# Patient Record
Sex: MALE | Race: WHITE
[De-identification: names, ages, dates, MRNs, and addresses within clinical notes are randomized per-mention and may not be internally consistent; named-entity substitution may affect disease eponyms.]

---

## 2007-09-25 VITALS — SYSTOLIC BLOOD PRESSURE: 172.7 MMHG

## 2020-01-15 NOTE — NUR
Patient is resting quietly in bed at this time. Denies presence of pain or
discomfort. Vitals within normal limits. A second peripheral IV is initiated
in the right forearm due to multiple scheduled IV medications. Will continue
to monitor.

## 2020-01-16 NOTE — NUR
Received report from PARRISH Agustin. Assessment complete. Alert and oriented.
Denies any pain or discomfort. Denies SOB. Meds adminsitered. RFA IV intact
with antibiotics infusing. On 2LO2 via oximask. tele moniotor in place, leads
checked. Needs attended too. Call light within reach.

## 2020-01-16 NOTE — NUR
Patient's BP was fluctuating with MAPs < 65. Patient asymptomatic. Dr. Howard
notified. Received orders to bolus 250mL NS IV one time.

## 2020-01-16 NOTE — NUR
MSW student met with patient to complete initial assessment. The patient lives
in [place] with his wife, Anita. The patient has a walker and is on 3L of
oxygen at home. The patient receives assistance with showers (2x weekly on
Tuesdays and Fridays) [facility]. The patient's RN
 is Sara [phone]. Any discharge instructions or updates
should be faxed to Trish at [phone]. MSW student faxed HNP. The
patient's PCP is Dr. Cotton and patient receives medications from [facility].
The patient has advanced directives in the EMR and designate his wife,
Anita.  will continue to follow.

## 2020-01-17 NOTE — NUR
Initial shift assessment done- denies pain- states didnt eat well tonight-did
not like the supper, denies pain, denies SOB, o2 at 2L/nc, Tele on, no
requests at this time- using call light appropriately

## 2020-01-17 NOTE — NUR
Patient resting in bed at this time.  He is on 02 at 2L/Oxy mask.  Changed to
02 at 2L/NC for breakfast.  Patient is alert and oriented x 3.  Denies any
pain/discomfort.  Skin w/d.  Color pale pink.  Lungs essentially clear with
resp even/unlabored.  Patient denies any production with cough.  HR
strong/regular.  Abd soft with bowel sounds x 4 quads.  PPP.  No edema.
Denies any needs.  Call light in place

## 2020-01-18 NOTE — NUR
Patient in bed, awake. Denies pain. IV in left FA flushed with NS, patent.
Denies further needs at this time. Will continue to monitor.

## 2020-01-18 NOTE — NUR
Assessment complete. Patient lying in bed resting. IV site CD&I. Patient is
aware of POC. Denies pain and shortness of breath at this time. Assisted
patient to the bathroom to have a loose BM. No further needs were expressed at
this time. Call light in reach.

## 2020-01-18 NOTE — NUR
Quiet night tonight- IV site leaking this morning- new site started in LFA
22g, Continues with Tele, denies pain, voiding well throughout the night VSS

## 2020-01-18 NOTE — NUR
Patient has had a good day. He has been resting in bed all day. He was able to
ambulate for me this am. He denies pain. States he is comfortable. No further
needs were expressed. Call light is within reach.

## 2020-01-19 NOTE — NUR
SW helped client facilitate DC. Wife is on her way with 02 machince and
clothing. Patient DC was faxed to Trish, No additional concerns at this time.;

## 2020-01-19 NOTE — NUR
Patient had a good day. No complaints of pain or shortness of breath. Patient
aware of plan of care. No family at bedside at this time. Call light is within
reach. No further needs expressed.

## 2020-01-19 NOTE — NUR
Assessment complete. Patient resting in bed. Heart sound were faint, hard to
hear. Patient denies pain or discomfort at this time. No shortness of breath.
IV site CD&I, flushed well. O2 flowing at 2L. Contact precautions in place.
No further needs were expressed.  Call light within reach.

## 2020-01-29 NOTE — NUR
PT resting peacefully in bed with eyes closed and no s/s of distress noted.
Will continue to monitor. Call light within reach.

## 2020-01-29 NOTE — NUR
Pt has pleasant demeanor. Denies pain and sore throat. Pt also presents with a
cough that is unproductive. No edema present. Pt requested I grabbed him a
drink. IV site clean, dry, and intact. I talked to pt's wife on the phone and
updated her of the pt's condition. Pt had no other requests at this time.

## 2020-01-29 NOTE — NUR
PT has been resting peacefully in bed during the shift and is able to make his
wants/needs known. Call light is within reach and PT has beverages available
on the bedside table within reach. PT denies pain, wants/needs at this time
and presents with no s/s of distress. PT is cooperative with care. PT appears
to have a sad affect and when this writer asks if PT is sad or depressed PT
replies that he is "ok" just didn't want to come back to the hospital so soon.
Therapeutic conversation provided during assessment. Will continue to monitor.

## 2020-01-29 NOTE — NUR
PT report received from dayshift nurse and meet and greet performed. PT
resting in bed watching tv with no wants/needs at this time. Will continue to
monitor.

## 2020-01-30 NOTE — NUR
PT resting in bed with eyes closed and no s/s of distress noted. Call light
within reach. Will continue to monitor.

## 2020-01-30 NOTE — NUR
Patient assessed at this time. Alert and oriented x 4, and able to make needs
known. Denies having pain and discomfort at this time. NS running at 75 ml/hr
to peripheral IV to left AC per orders. Site is without redness, warmth,
swelling, and pain. Denies SOB and dyspnea. Frequent moist cough. LS coarse
crackles throughout. HRR. Telmetry in place: paced. Capillary refill less than
3 seconds. Non-tenting skin turgor. Capillary refill less than 3 seconds.
Non-tenting skin turgor. No edema. Voices no questions, needs, or concerns at
this time. SCDs on. Uses bedside urinal. Urine clear and yellow. Resting in
bed with call light within reach.

## 2020-01-30 NOTE — NUR
PT HAD UNEVENTFUL DAY. HAS BEEN ABLE TO COUGH UP MUCUS TODAY, HAVE SENT DOWN A
SPUTUM SAMPLE TO LAB. NS @ 75 INFUSING WITHOUT ISSUES.

## 2020-01-31 NOTE — NUR
PT HAD UNEVENTFUL DAY. NO QUESTIONS OR ISSUES VOICED. THIS NURSE CALLED IN
SCRIPTS TO [facility] DUE TO NOT BEING ABLE TO GET SCRIPTS UNTIL MONDAY. REMOVED
IV AND TELE WITHOUT ISSUES. CNA ASSISTED PT FERNANDO.

## 2020-01-31 NOTE — NUR
Patient denied having pain and discomfort this shift. Continues to have moist
cough. Continues to wear oxygen at 2 L/min via NC, which is baseline for
patient. Has been using bedside urinal. IV fluids continue per orders. Patient
voices no questions, needs, or concerns at this time. Resting in bed with call
light within reach.

## 2020-01-31 NOTE — NUR
The patient is to discharge home this day, 1/31 with [facility]
with PT/OT/Skilled nursing services. STEPHANIE presented IM form to the patient. The
patient understood and signed the form. A copy was provided to the patient and
original was placed in the chart. STEPHANIE faxed discharge orders to Crystal with
[facility]. There are no additional needs at this time.

## 2021-05-25 ENCOUNTER — HOSPITAL ENCOUNTER (EMERGENCY)
Dept: HOSPITAL 19 - COL.ER | Age: 86
Discharge: ANOTHER HOSPITAL | End: 2021-05-25
Attending: EMERGENCY MEDICINE
Payer: COMMERCIAL

## 2021-05-25 VITALS — SYSTOLIC BLOOD PRESSURE: 120 MMHG | HEART RATE: 79 BPM | TEMPERATURE: 98.3 F | DIASTOLIC BLOOD PRESSURE: 61 MMHG

## 2021-05-25 VITALS — BODY MASS INDEX: 20.18 KG/M2 | HEIGHT: 69.02 IN | WEIGHT: 136.25 LBS

## 2021-05-25 DIAGNOSIS — D72.829: ICD-10-CM

## 2021-05-25 DIAGNOSIS — R74.8: ICD-10-CM

## 2021-05-25 DIAGNOSIS — J96.21: Primary | ICD-10-CM

## 2021-05-25 DIAGNOSIS — A41.9: ICD-10-CM

## 2021-05-25 DIAGNOSIS — J44.9: ICD-10-CM

## 2021-05-25 DIAGNOSIS — I25.2: ICD-10-CM

## 2021-05-25 DIAGNOSIS — Z79.52: ICD-10-CM

## 2021-05-25 DIAGNOSIS — Z20.822: ICD-10-CM

## 2021-05-25 DIAGNOSIS — Z79.84: ICD-10-CM

## 2021-05-25 DIAGNOSIS — Z86.73: ICD-10-CM

## 2021-05-25 DIAGNOSIS — Z87.891: ICD-10-CM

## 2021-05-25 DIAGNOSIS — Z79.899: ICD-10-CM

## 2021-05-25 DIAGNOSIS — I25.10: ICD-10-CM

## 2021-05-25 DIAGNOSIS — Z95.0: ICD-10-CM

## 2021-05-25 DIAGNOSIS — E11.9: ICD-10-CM

## 2021-05-25 LAB
ALBUMIN SERPL-MCNC: 4.2 GM/DL (ref 3.5–5)
ALP SERPL-CCNC: 83 U/L (ref 50–136)
ALT SERPL-CCNC: 12 U/L (ref 4–49)
ANION GAP SERPL CALC-SCNC: 11 MMOL/L (ref 7–16)
AST SERPL-CCNC: 21 U/L (ref 15–37)
BASOPHILS # BLD: 0 10*3/UL (ref 0–0.2)
BASOPHILS NFR BLD AUTO: 0.2 % (ref 0–2)
BILIRUB SERPL-MCNC: 2.8 MG/DL (ref 0–1)
BUN SERPL-MCNC: 41 MG/DL (ref 9–20)
CALCIUM SERPL-MCNC: 9 MG/DL (ref 8.4–10.2)
CHLORIDE SERPL-SCNC: 100 MMOL/L (ref 98–107)
CO2 SERPL-SCNC: 26 MMOL/L (ref 22–30)
CREAT SERPL-SCNC: 1.04 UMOL/L (ref 0.66–1.25)
EOSINOPHIL # BLD: 0 10*3/UL (ref 0–0.7)
EOSINOPHIL NFR BLD: 0.2 % (ref 0–4)
ERYTHROCYTE [DISTWIDTH] IN BLOOD BY AUTOMATED COUNT: 13.2 % (ref 11.5–14.5)
GLUCOSE SERPL-MCNC: 178 MG/DL (ref 74–106)
GRANULOCYTES # BLD AUTO: 83.8 % (ref 42.2–75.2)
HCT VFR BLD AUTO: 34.5 % (ref 42–52)
HGB BLD-MCNC: 11.9 G/DL (ref 13.5–18)
LYMPHOCYTES # BLD: 1.2 10*3/UL (ref 1.2–3.4)
LYMPHOCYTES NFR BLD: 7.1 % (ref 20–51)
MCH RBC QN AUTO: 33 PG (ref 27–31)
MCHC RBC AUTO-ENTMCNC: 35 G/DL (ref 33–37)
MCV RBC AUTO: 96 FL (ref 80–100)
MONOCYTES # BLD: 1.3 10*3/UL (ref 0.1–0.6)
MONOCYTES NFR BLD AUTO: 8.3 % (ref 1.7–9.3)
NEUTROPHILS # BLD: 13.6 10*3/UL (ref 1.4–6.5)
PLATELET # BLD AUTO: 209 K/MM3 (ref 130–400)
PMV BLD AUTO: 10.9 FL (ref 7.4–10.4)
POTASSIUM SERPL-SCNC: 4.2 MMOL/L (ref 3.4–5)
PROT SERPL-MCNC: 8 GM/DL (ref 6.4–8.2)
RBC # BLD AUTO: 3.59 M/MM3 (ref 4.2–5.6)
SODIUM SERPL-SCNC: 137 MMOL/L (ref 137–145)
TROPONIN I SERPL-MCNC: 0.15 NG/ML (ref 0–0.04)

## 2021-07-31 ENCOUNTER — HOSPITAL ENCOUNTER (INPATIENT)
Dept: HOSPITAL 19 - COL.ER | Age: 86
LOS: 1 days | Discharge: ANOTHER HOSPITAL | DRG: 445 | End: 2021-08-01
Attending: STUDENT IN AN ORGANIZED HEALTH CARE EDUCATION/TRAINING PROGRAM | Admitting: STUDENT IN AN ORGANIZED HEALTH CARE EDUCATION/TRAINING PROGRAM
Payer: COMMERCIAL

## 2021-07-31 VITALS — TEMPERATURE: 98.4 F | DIASTOLIC BLOOD PRESSURE: 43 MMHG | SYSTOLIC BLOOD PRESSURE: 116 MMHG | HEART RATE: 75 BPM

## 2021-07-31 VITALS — HEART RATE: 75 BPM | TEMPERATURE: 97.3 F | DIASTOLIC BLOOD PRESSURE: 49 MMHG | SYSTOLIC BLOOD PRESSURE: 133 MMHG

## 2021-07-31 VITALS — HEIGHT: 67.99 IN | WEIGHT: 133.38 LBS | BODY MASS INDEX: 20.21 KG/M2

## 2021-07-31 DIAGNOSIS — I25.10: ICD-10-CM

## 2021-07-31 DIAGNOSIS — I48.91: ICD-10-CM

## 2021-07-31 DIAGNOSIS — Z20.822: ICD-10-CM

## 2021-07-31 DIAGNOSIS — Z95.810: ICD-10-CM

## 2021-07-31 DIAGNOSIS — J96.11: ICD-10-CM

## 2021-07-31 DIAGNOSIS — I35.0: ICD-10-CM

## 2021-07-31 DIAGNOSIS — I50.22: ICD-10-CM

## 2021-07-31 DIAGNOSIS — K80.12: Primary | ICD-10-CM

## 2021-07-31 DIAGNOSIS — Z86.73: ICD-10-CM

## 2021-07-31 DIAGNOSIS — Z85.828: ICD-10-CM

## 2021-07-31 DIAGNOSIS — I27.20: ICD-10-CM

## 2021-07-31 DIAGNOSIS — E11.9: ICD-10-CM

## 2021-07-31 DIAGNOSIS — E78.5: ICD-10-CM

## 2021-07-31 DIAGNOSIS — Z87.891: ICD-10-CM

## 2021-07-31 LAB
ALBUMIN SERPL-MCNC: 4.3 GM/DL (ref 3.5–5)
ALP SERPL-CCNC: 93 U/L (ref 50–136)
ALT SERPL-CCNC: 12 U/L (ref 4–49)
ANION GAP SERPL CALC-SCNC: 8 MMOL/L (ref 7–16)
AST SERPL-CCNC: 18 U/L (ref 15–37)
BASOPHILS # BLD: 0 10*3/UL (ref 0–0.2)
BASOPHILS NFR BLD AUTO: 0.2 % (ref 0–2)
BILIRUB SERPL-MCNC: 1.5 MG/DL (ref 0–1)
BUN SERPL-MCNC: 36 MG/DL (ref 9–20)
CALCIUM SERPL-MCNC: 9.2 MG/DL (ref 8.4–10.2)
CHLORIDE SERPL-SCNC: 101 MMOL/L (ref 98–107)
CO2 SERPL-SCNC: 28 MMOL/L (ref 22–30)
CREAT SERPL-SCNC: 1.03 UMOL/L (ref 0.66–1.25)
EOSINOPHIL # BLD: 0 10*3/UL (ref 0–0.7)
EOSINOPHIL NFR BLD: 0 % (ref 0–4)
ERYTHROCYTE [DISTWIDTH] IN BLOOD BY AUTOMATED COUNT: 13.3 % (ref 11.5–14.5)
GLUCOSE SERPL-MCNC: 204 MG/DL (ref 74–106)
GRANULOCYTES # BLD AUTO: 83.1 % (ref 42.2–75.2)
HCT VFR BLD AUTO: 33.2 % (ref 42–52)
HGB BLD-MCNC: 11.1 G/DL (ref 13.5–18)
LIPASE SERPL-CCNC: 49 U/L (ref 23–300)
LYMPHOCYTES # BLD: 1.3 10*3/UL (ref 1.2–3.4)
LYMPHOCYTES NFR BLD: 7.9 % (ref 20–51)
MCH RBC QN AUTO: 32 PG (ref 27–31)
MCHC RBC AUTO-ENTMCNC: 33 G/DL (ref 33–37)
MCV RBC AUTO: 97 FL (ref 80–100)
MONOCYTES # BLD: 1.4 10*3/UL (ref 0.1–0.6)
MONOCYTES NFR BLD AUTO: 8.3 % (ref 1.7–9.3)
NEUTROPHILS # BLD: 14.2 10*3/UL (ref 1.4–6.5)
PH UR STRIP.AUTO: 5 [PH] (ref 5–8)
PLATELET # BLD AUTO: 207 K/MM3 (ref 130–400)
PMV BLD AUTO: 10.7 FL (ref 7.4–10.4)
POTASSIUM SERPL-SCNC: 4.3 MMOL/L (ref 3.4–5)
PROT SERPL-MCNC: 8 GM/DL (ref 6.4–8.2)
RBC # BLD AUTO: 3.43 M/MM3 (ref 4.2–5.6)
RBC # UR: (no result) /HPF
SODIUM SERPL-SCNC: 136 MMOL/L (ref 137–145)
SP GR UR STRIP.AUTO: 1.04 (ref 1–1.03)
SQUAMOUS # URNS: (no result) /HPF
URN COLLECT METHOD CLASS: (no result)

## 2021-07-31 PROCEDURE — A9284 NON-ELECTRONIC SPIROMETER: HCPCS

## 2021-08-01 VITALS — HEART RATE: 76 BPM | TEMPERATURE: 97.5 F | DIASTOLIC BLOOD PRESSURE: 43 MMHG | SYSTOLIC BLOOD PRESSURE: 114 MMHG

## 2021-08-01 VITALS — DIASTOLIC BLOOD PRESSURE: 45 MMHG | HEART RATE: 75 BPM | TEMPERATURE: 98.2 F | SYSTOLIC BLOOD PRESSURE: 110 MMHG

## 2021-08-01 VITALS — SYSTOLIC BLOOD PRESSURE: 108 MMHG | HEART RATE: 74 BPM | TEMPERATURE: 97.9 F | DIASTOLIC BLOOD PRESSURE: 41 MMHG

## 2021-08-01 VITALS — SYSTOLIC BLOOD PRESSURE: 108 MMHG | DIASTOLIC BLOOD PRESSURE: 41 MMHG | TEMPERATURE: 97.9 F | HEART RATE: 74 BPM

## 2021-08-01 VITALS — HEART RATE: 74 BPM | DIASTOLIC BLOOD PRESSURE: 56 MMHG | TEMPERATURE: 97.7 F | SYSTOLIC BLOOD PRESSURE: 96 MMHG

## 2021-08-01 LAB
ALBUMIN SERPL-MCNC: 3.4 GM/DL (ref 3.5–5)
ALP SERPL-CCNC: 68 U/L (ref 50–136)
ALT SERPL-CCNC: 11 U/L (ref 4–49)
ANION GAP SERPL CALC-SCNC: 5 MMOL/L (ref 7–16)
AST SERPL-CCNC: 20 U/L (ref 15–37)
BILIRUB SERPL-MCNC: 1.7 MG/DL (ref 0–1)
BUN SERPL-MCNC: 26 MG/DL (ref 9–20)
CALCIUM SERPL-MCNC: 7.9 MG/DL (ref 8.4–10.2)
CHLORIDE SERPL-SCNC: 102 MMOL/L (ref 98–107)
CO2 SERPL-SCNC: 27 MMOL/L (ref 22–30)
CREAT SERPL-SCNC: 0.95 UMOL/L (ref 0.66–1.25)
ERYTHROCYTE [DISTWIDTH] IN BLOOD BY AUTOMATED COUNT: 13.6 % (ref 11.5–14.5)
GLUCOSE SERPL-MCNC: 148 MG/DL (ref 74–106)
HCT VFR BLD AUTO: 33 % (ref 42–52)
HGB BLD-MCNC: 10.8 G/DL (ref 13.5–18)
HYPOCHROMIA BLD QL SMEAR: (no result)
INR BLD: 1.3 (ref 0.8–3)
LYMPHOCYTES NFR BLD MANUAL: 9 % (ref 20–51)
MCH RBC QN AUTO: 33 PG (ref 27–31)
MCHC RBC AUTO-ENTMCNC: 33 G/DL (ref 33–37)
MCV RBC AUTO: 99 FL (ref 80–100)
MONOCYTES NFR BLD: 9 % (ref 1.7–9.3)
NEUTS BAND NFR BLD: 2 % (ref 0–10)
NEUTS SEG NFR BLD MANUAL: 80 % (ref 42–75.2)
PLATELET # BLD AUTO: 186 K/MM3 (ref 130–400)
PLATELET BLD QL SMEAR: NORMAL
PMV BLD AUTO: 10.8 FL (ref 7.4–10.4)
POTASSIUM SERPL-SCNC: 4.2 MMOL/L (ref 3.4–5)
PROT SERPL-MCNC: 6.6 GM/DL (ref 6.4–8.2)
PROTHROMBIN TIME: 13.9 SECONDS (ref 9.7–12.8)
RBC # BLD AUTO: 3.32 M/MM3 (ref 4.2–5.6)
SODIUM SERPL-SCNC: 133 MMOL/L (ref 137–145)
TOXIC GRANULES BLD QL SMEAR: PRESENT

## 2021-08-01 NOTE — NUR
Report recieved from PARRISH Barboza. Pt in bed resting, c/o pain to RUQ and
requesting pain medication, will provide.

## 2021-08-01 NOTE — NUR
Pts wife on phone this afternoon very upset regarding plan ot transfer patient
due to her lack of transportation. Dr. Marino, Annalee Vernon and myself tried
to call her many times, no response. Pt has now called back and got Dr. Marino to room to talk to family member. Wife gave [facility]  Felicia Hewittgeronimo phone number [phone].  PRN pain medsgiven per requesto kaleb
shift. Will continue to monitor until shift chagne and give report to
night shift nurse who will resum ecare.

## 2021-08-01 NOTE — NUR
Assessment charted. PT on 02 at 2.5L, Baseline for patient. Resting in bed
taking small amount of CLD. States pain is 10/10 to RUQ and worst pain pt has
had, PRN morphine provided twice over shift so far. Chavo to see pt and
states that he is a [facility] patient so [facility] will need to clear him for surgery
when he is back on tomorrow. Called Dr. Bay with update on clearance and
pain level, called Radiology and confirmed htere are no options for
Interventional Radiology on the weekends. Pt updated and Dr. Bay will be to
visit pt shortly. Hospitalist updated on pain as well. INT to RFA. Will
continue to monitor.

## 2021-08-01 NOTE — NUR
Pt left via cart with EMS staff at this time. Report called to [facility].
Wife did consent with Dr. Marino in room. PT left with all belongings. Packet
reviewed and VORB for pain meds as needed from [facility].  Transfer
criteria met.

## 2022-06-13 ENCOUNTER — HOSPITAL ENCOUNTER (INPATIENT)
Dept: HOSPITAL 19 - COL.ER | Age: 87
LOS: 10 days | Discharge: NURSING, REHAB OR LONG-TERM CARE | DRG: 280 | End: 2022-06-23
Attending: FAMILY MEDICINE | Admitting: FAMILY MEDICINE
Payer: MEDICARE

## 2022-06-13 VITALS — HEIGHT: 67.99 IN | WEIGHT: 139.99 LBS | BODY MASS INDEX: 21.22 KG/M2

## 2022-06-13 DIAGNOSIS — T38.0X5A: ICD-10-CM

## 2022-06-13 DIAGNOSIS — Z23: ICD-10-CM

## 2022-06-13 DIAGNOSIS — Z87.891: ICD-10-CM

## 2022-06-13 DIAGNOSIS — Z86.73: ICD-10-CM

## 2022-06-13 DIAGNOSIS — I13.0: Primary | ICD-10-CM

## 2022-06-13 DIAGNOSIS — E78.5: ICD-10-CM

## 2022-06-13 DIAGNOSIS — D64.9: ICD-10-CM

## 2022-06-13 DIAGNOSIS — Z79.84: ICD-10-CM

## 2022-06-13 DIAGNOSIS — K81.1: ICD-10-CM

## 2022-06-13 DIAGNOSIS — I25.10: ICD-10-CM

## 2022-06-13 DIAGNOSIS — N17.9: ICD-10-CM

## 2022-06-13 DIAGNOSIS — E87.5: ICD-10-CM

## 2022-06-13 DIAGNOSIS — Z85.828: ICD-10-CM

## 2022-06-13 DIAGNOSIS — I25.5: ICD-10-CM

## 2022-06-13 DIAGNOSIS — Z98.52: ICD-10-CM

## 2022-06-13 DIAGNOSIS — I95.9: ICD-10-CM

## 2022-06-13 DIAGNOSIS — Z99.81: ICD-10-CM

## 2022-06-13 DIAGNOSIS — E44.0: ICD-10-CM

## 2022-06-13 DIAGNOSIS — I50.23: ICD-10-CM

## 2022-06-13 DIAGNOSIS — E11.65: ICD-10-CM

## 2022-06-13 DIAGNOSIS — Z95.0: ICD-10-CM

## 2022-06-13 DIAGNOSIS — I48.91: ICD-10-CM

## 2022-06-13 DIAGNOSIS — Z95.5: ICD-10-CM

## 2022-06-13 DIAGNOSIS — I70.0: ICD-10-CM

## 2022-06-13 DIAGNOSIS — K59.00: ICD-10-CM

## 2022-06-13 DIAGNOSIS — J44.1: ICD-10-CM

## 2022-06-13 DIAGNOSIS — D72.829: ICD-10-CM

## 2022-06-13 DIAGNOSIS — I35.0: ICD-10-CM

## 2022-06-13 DIAGNOSIS — I21.A1: ICD-10-CM

## 2022-06-13 DIAGNOSIS — K21.9: ICD-10-CM

## 2022-06-13 DIAGNOSIS — E87.2: ICD-10-CM

## 2022-06-13 DIAGNOSIS — Z87.01: ICD-10-CM

## 2022-06-13 DIAGNOSIS — I27.22: ICD-10-CM

## 2022-06-13 DIAGNOSIS — E11.22: ICD-10-CM

## 2022-06-13 DIAGNOSIS — J96.21: ICD-10-CM
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
## 2022-06-13 DIAGNOSIS — I25.2: ICD-10-CM

## 2022-06-13 DIAGNOSIS — E11.649: ICD-10-CM

## 2022-06-13 DIAGNOSIS — K72.00: ICD-10-CM

## 2022-06-13 DIAGNOSIS — Z87.19: ICD-10-CM

## 2022-06-13 DIAGNOSIS — N18.9: ICD-10-CM

## 2022-06-13 DIAGNOSIS — J91.8: ICD-10-CM

## 2022-06-13 DIAGNOSIS — N28.1: ICD-10-CM

## 2022-06-13 DIAGNOSIS — Z20.822: ICD-10-CM

## 2022-06-13 LAB
ALBUMIN SERPL-MCNC: 3.1 GM/DL (ref 3.4–4.8)
ALP SERPL-CCNC: 76 U/L (ref 40–150)
ALT SERPL-CCNC: 11 U/L (ref 0–55)
ANION GAP SERPL CALC-SCNC: 12 MMOL/L (ref 7–16)
ANION GAP SERPL CALC-SCNC: 15 MMOL/L (ref 7–16)
AST SERPL-CCNC: 14 U/L (ref 5–34)
BASE EXCESS BLDA CALC-SCNC: 3.6 MMOL/L (ref -2–2)
BASOPHILS # BLD: 0.1 K/MM3 (ref 0–0.2)
BASOPHILS NFR BLD AUTO: 0.5 % (ref 0–2)
BILIRUB SERPL-MCNC: 2.2 MG/DL (ref 0.2–1.2)
BUN SERPL-MCNC: 48 MG/DL (ref 8–26)
BUN SERPL-MCNC: 51 MG/DL (ref 8–26)
CALCIUM SERPL-MCNC: 9.3 MG/DL (ref 8.4–10.2)
CALCIUM SERPL-MCNC: 9.5 MG/DL (ref 8.4–10.2)
CHLORIDE SERPL-SCNC: 98 MMOL/L (ref 98–107)
CHLORIDE SERPL-SCNC: 98 MMOL/L (ref 98–107)
CO2 BLDA-SCNC: 29.1 MMOL/L
CO2 SERPL-SCNC: 25 MMOL/L (ref 23–31)
CO2 SERPL-SCNC: 26 MMOL/L (ref 23–31)
CREAT SERPL-SCNC: 1.43 MG/DL (ref 0.72–1.25)
CREAT SERPL-SCNC: 1.58 MG/DL (ref 0.72–1.25)
CRP SERPL-MCNC: 6.48 MG/DL (ref 0–0.5)
EOSINOPHIL # BLD: 0 K/MM3 (ref 0–0.7)
EOSINOPHIL NFR BLD: 0.3 % (ref 0–4)
ERYTHROCYTE [DISTWIDTH] IN BLOOD BY AUTOMATED COUNT: 13.5 % (ref 11.5–14.5)
GLUCOSE SERPL-MCNC: 214 MG/DL (ref 70–99)
GLUCOSE SERPL-MCNC: 394 MG/DL (ref 70–99)
GRANULOCYTES # BLD AUTO: 79.7 % (ref 42.2–75.2)
HCO3 BLDA-SCNC: 27.9 MEQ/L (ref 22–26)
HCT VFR BLD AUTO: 32.4 % (ref 42–52)
HGB BLD-MCNC: 10.6 G/DL (ref 13.5–18)
LYMPHOCYTES # BLD: 1.1 K/MM3 (ref 1.2–3.4)
LYMPHOCYTES NFR BLD: 8.7 % (ref 20–51)
MCH RBC QN AUTO: 32 PG (ref 27–31)
MCHC RBC AUTO-ENTMCNC: 33 G/DL (ref 33–37)
MCV RBC AUTO: 99 FL (ref 80–100)
MONOCYTES # BLD: 1.3 K/MM3 (ref 0.1–0.6)
MONOCYTES NFR BLD AUTO: 10.4 % (ref 1.7–9.3)
NEUTROPHILS # BLD: 10.2 K/MM3 (ref 1.4–6.5)
PCO2 BLDA: 41 MMHG (ref 35–45)
PLATELET # BLD AUTO: 201 K/MM3 (ref 130–400)
PMV BLD AUTO: 11.9 FL (ref 7.4–10.4)
PO2 BLDA: 71.3 MMHG (ref 80–100)
POTASSIUM SERPL-SCNC: 4.6 MMOL/L (ref 3.5–4.5)
POTASSIUM SERPL-SCNC: 6.3 MMOL/L (ref 3.5–4.5)
PROT SERPL-MCNC: 7.7 GM/DL (ref 6.2–8.1)
RBC # BLD AUTO: 3.28 M/MM3 (ref 4.2–5.6)
SAO2 % BLDA: 94.8 % (ref 92–100)
SODIUM SERPL-SCNC: 136 MMOL/L (ref 136–145)
SODIUM SERPL-SCNC: 138 MMOL/L (ref 136–145)
TROPONIN I SERPL-MCNC: 0.12 NG/ML (ref 0–0.03)

## 2022-06-13 PROCEDURE — A9270 NON-COVERED ITEM OR SERVICE: HCPCS

## 2022-06-13 PROCEDURE — A9567 TECHNETIUM TC-99M AEROSOL: HCPCS

## 2022-06-13 PROCEDURE — C1892 INTRO/SHEATH,FIXED,PEEL-AWAY: HCPCS

## 2022-06-13 PROCEDURE — C1751 CATH, INF, PER/CENT/MIDLINE: HCPCS

## 2022-06-13 PROCEDURE — A9284 NON-ELECTRONIC SPIROMETER: HCPCS

## 2022-06-13 PROCEDURE — A9540 TC99M MAA: HCPCS

## 2022-06-14 VITALS — TEMPERATURE: 97.8 F | DIASTOLIC BLOOD PRESSURE: 45 MMHG | SYSTOLIC BLOOD PRESSURE: 100 MMHG | HEART RATE: 76 BPM

## 2022-06-14 VITALS — TEMPERATURE: 97.7 F | SYSTOLIC BLOOD PRESSURE: 138 MMHG | HEART RATE: 74 BPM | DIASTOLIC BLOOD PRESSURE: 79 MMHG

## 2022-06-14 VITALS — HEART RATE: 75 BPM | TEMPERATURE: 97.9 F | SYSTOLIC BLOOD PRESSURE: 136 MMHG | DIASTOLIC BLOOD PRESSURE: 49 MMHG

## 2022-06-14 VITALS — SYSTOLIC BLOOD PRESSURE: 126 MMHG | TEMPERATURE: 97.8 F | DIASTOLIC BLOOD PRESSURE: 50 MMHG | HEART RATE: 59 BPM

## 2022-06-14 VITALS — HEART RATE: 74 BPM | TEMPERATURE: 97.4 F | SYSTOLIC BLOOD PRESSURE: 114 MMHG | DIASTOLIC BLOOD PRESSURE: 58 MMHG

## 2022-06-14 VITALS — DIASTOLIC BLOOD PRESSURE: 96 MMHG | SYSTOLIC BLOOD PRESSURE: 157 MMHG | HEART RATE: 62 BPM | TEMPERATURE: 99.4 F

## 2022-06-14 VITALS — TEMPERATURE: 97.9 F | DIASTOLIC BLOOD PRESSURE: 47 MMHG | SYSTOLIC BLOOD PRESSURE: 91 MMHG | HEART RATE: 76 BPM

## 2022-06-14 VITALS — SYSTOLIC BLOOD PRESSURE: 102 MMHG | DIASTOLIC BLOOD PRESSURE: 52 MMHG

## 2022-06-14 LAB
ANION GAP SERPL CALC-SCNC: 15 MMOL/L (ref 7–16)
BUN SERPL-MCNC: 49 MG/DL (ref 8–26)
CALCIUM SERPL-MCNC: 9.6 MG/DL (ref 8.4–10.2)
CHLORIDE SERPL-SCNC: 96 MMOL/L (ref 98–107)
CO2 SERPL-SCNC: 26 MMOL/L (ref 23–31)
CREAT SERPL-SCNC: 1.42 MG/DL (ref 0.72–1.25)
ERYTHROCYTE [DISTWIDTH] IN BLOOD BY AUTOMATED COUNT: 13.2 % (ref 11.5–14.5)
GLUCOSE SERPL-MCNC: 209 MG/DL (ref 70–99)
HCT VFR BLD AUTO: 32.8 % (ref 42–52)
HGB BLD-MCNC: 10.9 G/DL (ref 13.5–18)
LYMPHOCYTES NFR BLD MANUAL: 7 % (ref 20–51)
MCH RBC QN AUTO: 33 PG (ref 27–31)
MCHC RBC AUTO-ENTMCNC: 33 G/DL (ref 33–37)
MCV RBC AUTO: 98 FL (ref 80–100)
MONOCYTES NFR BLD: 3 % (ref 1.7–9.3)
NEUTS BAND NFR BLD: 9 % (ref 0–10)
NEUTS SEG NFR BLD MANUAL: 81 % (ref 42–75.2)
PLATELET # BLD AUTO: 171 K/MM3 (ref 130–400)
PLATELET BLD QL SMEAR: NORMAL
PMV BLD AUTO: 12.2 FL (ref 7.4–10.4)
POTASSIUM SERPL-SCNC: 5.1 MMOL/L (ref 3.5–4.5)
RBC # BLD AUTO: 3.34 M/MM3 (ref 4.2–5.6)
SODIUM SERPL-SCNC: 137 MMOL/L (ref 136–145)

## 2022-06-15 VITALS — HEART RATE: 75 BPM | TEMPERATURE: 97.7 F | SYSTOLIC BLOOD PRESSURE: 118 MMHG | DIASTOLIC BLOOD PRESSURE: 64 MMHG

## 2022-06-15 VITALS — HEART RATE: 75 BPM | TEMPERATURE: 98.7 F | SYSTOLIC BLOOD PRESSURE: 105 MMHG | DIASTOLIC BLOOD PRESSURE: 50 MMHG

## 2022-06-15 VITALS — SYSTOLIC BLOOD PRESSURE: 98 MMHG | DIASTOLIC BLOOD PRESSURE: 52 MMHG

## 2022-06-15 VITALS — TEMPERATURE: 97.9 F | HEART RATE: 75 BPM | SYSTOLIC BLOOD PRESSURE: 111 MMHG | DIASTOLIC BLOOD PRESSURE: 51 MMHG

## 2022-06-15 VITALS — TEMPERATURE: 97.9 F | SYSTOLIC BLOOD PRESSURE: 113 MMHG | DIASTOLIC BLOOD PRESSURE: 47 MMHG | HEART RATE: 74 BPM

## 2022-06-15 VITALS — SYSTOLIC BLOOD PRESSURE: 112 MMHG | TEMPERATURE: 97.3 F | DIASTOLIC BLOOD PRESSURE: 74 MMHG | HEART RATE: 67 BPM

## 2022-06-15 VITALS — TEMPERATURE: 97.6 F | SYSTOLIC BLOOD PRESSURE: 143 MMHG | HEART RATE: 76 BPM | DIASTOLIC BLOOD PRESSURE: 56 MMHG

## 2022-06-15 LAB
ANION GAP SERPL CALC-SCNC: 13 MMOL/L (ref 7–16)
BUN SERPL-MCNC: 69 MG/DL (ref 8–26)
CALCIUM SERPL-MCNC: 8.6 MG/DL (ref 8.4–10.2)
CHLORIDE SERPL-SCNC: 96 MMOL/L (ref 98–107)
CO2 SERPL-SCNC: 23 MMOL/L (ref 23–31)
CREAT SERPL-SCNC: 1.99 MG/DL (ref 0.72–1.25)
GLUCOSE SERPL-MCNC: 280 MG/DL (ref 70–99)
MAGNESIUM SERPL-MCNC: 1.9 MG/DL (ref 1.6–2.6)
POTASSIUM SERPL-SCNC: 5.6 MMOL/L (ref 3.5–4.5)
SODIUM SERPL-SCNC: 132 MMOL/L (ref 136–145)
TROPONIN I SERPL-MCNC: 0.36 NG/ML (ref 0–0.03)

## 2022-06-16 VITALS — SYSTOLIC BLOOD PRESSURE: 102 MMHG | HEART RATE: 92 BPM | TEMPERATURE: 99.2 F | DIASTOLIC BLOOD PRESSURE: 51 MMHG

## 2022-06-16 VITALS — TEMPERATURE: 97.8 F | DIASTOLIC BLOOD PRESSURE: 45 MMHG | SYSTOLIC BLOOD PRESSURE: 98 MMHG | HEART RATE: 73 BPM

## 2022-06-16 VITALS — SYSTOLIC BLOOD PRESSURE: 111 MMHG | DIASTOLIC BLOOD PRESSURE: 50 MMHG | TEMPERATURE: 97.5 F | HEART RATE: 74 BPM

## 2022-06-16 VITALS — HEART RATE: 75 BPM | DIASTOLIC BLOOD PRESSURE: 54 MMHG | SYSTOLIC BLOOD PRESSURE: 102 MMHG | TEMPERATURE: 96.5 F

## 2022-06-16 VITALS — HEART RATE: 75 BPM | DIASTOLIC BLOOD PRESSURE: 54 MMHG | TEMPERATURE: 97.6 F | SYSTOLIC BLOOD PRESSURE: 101 MMHG

## 2022-06-16 VITALS — DIASTOLIC BLOOD PRESSURE: 52 MMHG | HEART RATE: 75 BPM | TEMPERATURE: 97.8 F | SYSTOLIC BLOOD PRESSURE: 101 MMHG

## 2022-06-16 LAB
ANION GAP SERPL CALC-SCNC: 14 MMOL/L (ref 7–16)
BASOPHILS # BLD: 0 K/MM3 (ref 0–0.2)
BASOPHILS NFR BLD AUTO: 0.2 % (ref 0–2)
BUN SERPL-MCNC: 87 MG/DL (ref 8–26)
CALCIUM SERPL-MCNC: 8.2 MG/DL (ref 8.4–10.2)
CHLORIDE SERPL-SCNC: 96 MMOL/L (ref 98–107)
CO2 SERPL-SCNC: 23 MMOL/L (ref 23–31)
CREAT SERPL-SCNC: 2.31 MG/DL (ref 0.72–1.25)
EOSINOPHIL # BLD: 0 K/MM3 (ref 0–0.7)
EOSINOPHIL NFR BLD: 0 % (ref 0–4)
ERYTHROCYTE [DISTWIDTH] IN BLOOD BY AUTOMATED COUNT: 13.2 % (ref 11.5–14.5)
GLUCOSE SERPL-MCNC: 163 MG/DL (ref 70–99)
GLUCOSE UR QL STRIP.AUTO: (no result)
GRANULOCYTES # BLD AUTO: 85.5 % (ref 42.2–75.2)
HCT VFR BLD AUTO: 29.3 % (ref 42–52)
HGB BLD-MCNC: 10 G/DL (ref 13.5–18)
LYMPHOCYTES # BLD: 1.2 K/MM3 (ref 1.2–3.4)
LYMPHOCYTES NFR BLD: 6.9 % (ref 20–51)
MCH RBC QN AUTO: 33 PG (ref 27–31)
MCHC RBC AUTO-ENTMCNC: 34 G/DL (ref 33–37)
MCV RBC AUTO: 95 FL (ref 80–100)
MONOCYTES # BLD: 1.2 K/MM3 (ref 0.1–0.6)
MONOCYTES NFR BLD AUTO: 6.8 % (ref 1.7–9.3)
NEUTROPHILS # BLD: 15 K/MM3 (ref 1.4–6.5)
PH UR STRIP.AUTO: 5 [PH] (ref 5–8)
PLATELET # BLD AUTO: 219 K/MM3 (ref 130–400)
PMV BLD AUTO: 11.9 FL (ref 7.4–10.4)
POTASSIUM SERPL-SCNC: 5.3 MMOL/L (ref 3.5–4.5)
RBC # BLD AUTO: 3.07 M/MM3 (ref 4.2–5.6)
RBC # UR: (no result) /HPF (ref 0–2)
SODIUM SERPL-SCNC: 133 MMOL/L (ref 136–145)
SP GR UR STRIP.AUTO: 1.01 (ref 1–1.03)
URN COLLECT METHOD CLASS: (no result)
WBC # UR: (no result) /HPF (ref 0–2)

## 2022-06-17 VITALS — OXYGEN SATURATION: 100 %

## 2022-06-17 VITALS — OXYGEN SATURATION: 94 %

## 2022-06-17 VITALS — OXYGEN SATURATION: 95 %

## 2022-06-17 VITALS — OXYGEN SATURATION: 97 %

## 2022-06-17 VITALS — OXYGEN SATURATION: 88 %

## 2022-06-17 VITALS — OXYGEN SATURATION: 96 %

## 2022-06-17 VITALS — OXYGEN SATURATION: 100 % | SYSTOLIC BLOOD PRESSURE: 93 MMHG | DIASTOLIC BLOOD PRESSURE: 54 MMHG

## 2022-06-17 VITALS — OXYGEN SATURATION: 92 %

## 2022-06-17 VITALS — OXYGEN SATURATION: 99 %

## 2022-06-17 VITALS — HEART RATE: 74 BPM | SYSTOLIC BLOOD PRESSURE: 105 MMHG | DIASTOLIC BLOOD PRESSURE: 46 MMHG | TEMPERATURE: 98 F

## 2022-06-17 VITALS — OXYGEN SATURATION: 98 %

## 2022-06-17 VITALS — OXYGEN SATURATION: 91 %

## 2022-06-17 VITALS — OXYGEN SATURATION: 93 %

## 2022-06-17 VITALS — SYSTOLIC BLOOD PRESSURE: 89 MMHG | DIASTOLIC BLOOD PRESSURE: 46 MMHG | HEART RATE: 75 BPM | TEMPERATURE: 97.4 F

## 2022-06-17 VITALS — SYSTOLIC BLOOD PRESSURE: 110 MMHG | DIASTOLIC BLOOD PRESSURE: 32 MMHG | HEART RATE: 75 BPM | TEMPERATURE: 98 F

## 2022-06-17 VITALS — OXYGEN SATURATION: 90 %

## 2022-06-17 VITALS — TEMPERATURE: 97.5 F | HEART RATE: 75 BPM | DIASTOLIC BLOOD PRESSURE: 51 MMHG | SYSTOLIC BLOOD PRESSURE: 112 MMHG

## 2022-06-17 VITALS — OXYGEN SATURATION: 95 % | HEART RATE: 60 BPM | SYSTOLIC BLOOD PRESSURE: 109 MMHG | DIASTOLIC BLOOD PRESSURE: 56 MMHG

## 2022-06-17 VITALS — OXYGEN SATURATION: 100 % | SYSTOLIC BLOOD PRESSURE: 93 MMHG | DIASTOLIC BLOOD PRESSURE: 49 MMHG

## 2022-06-17 VITALS — HEART RATE: 74 BPM | SYSTOLIC BLOOD PRESSURE: 100 MMHG | DIASTOLIC BLOOD PRESSURE: 55 MMHG | TEMPERATURE: 98.1 F

## 2022-06-17 VITALS — SYSTOLIC BLOOD PRESSURE: 89 MMHG | OXYGEN SATURATION: 98 % | DIASTOLIC BLOOD PRESSURE: 46 MMHG

## 2022-06-17 VITALS — OXYGEN SATURATION: 87 %

## 2022-06-17 VITALS — OXYGEN SATURATION: 85 %

## 2022-06-17 LAB
ANION GAP SERPL CALC-SCNC: 17 MMOL/L (ref 7–16)
ANION GAP SERPL CALC-SCNC: 21 MMOL/L (ref 7–16)
APTT PPP: 29.7 SECONDS (ref 26–37)
BASE EXCESS BLDA CALC-SCNC: -6.5 MMOL/L (ref -2–2)
BUN SERPL-MCNC: 94 MG/DL (ref 8–26)
BUN SERPL-MCNC: 99 MG/DL (ref 8–26)
CALCIUM SERPL-MCNC: 8.1 MG/DL (ref 8.4–10.2)
CALCIUM SERPL-MCNC: 8.1 MG/DL (ref 8.4–10.2)
CHLORIDE SERPL-SCNC: 93 MMOL/L (ref 98–107)
CHLORIDE SERPL-SCNC: 94 MMOL/L (ref 98–107)
CO2 BLDA-SCNC: 20 MMOL/L
CO2 SERPL-SCNC: 19 MMOL/L (ref 23–31)
CO2 SERPL-SCNC: 21 MMOL/L (ref 23–31)
CREAT SERPL-SCNC: 2.54 MG/DL (ref 0.72–1.25)
CREAT SERPL-SCNC: 2.88 MG/DL (ref 0.72–1.25)
DEPRECATED D DIMER PPP IA-ACNC: 608 NG/MLDDU (ref 200–230)
ERYTHROCYTE [DISTWIDTH] IN BLOOD BY AUTOMATED COUNT: 13.3 % (ref 11.5–14.5)
GLUCOSE SERPL-MCNC: 116 MG/DL (ref 70–99)
GLUCOSE SERPL-MCNC: 80 MG/DL (ref 70–99)
HCO3 BLDA-SCNC: 18.8 MEQ/L (ref 22–26)
HCT VFR BLD AUTO: 33.2 % (ref 42–52)
HGB BLD-MCNC: 11.1 G/DL (ref 13.5–18)
INHALED O2 CONCENTRATION: 36 %
LYMPHOCYTES NFR BLD MANUAL: 6 % (ref 20–51)
MACROCYTES BLD QL SMEAR: (no result)
MCH RBC QN AUTO: 33 PG (ref 27–31)
MCHC RBC AUTO-ENTMCNC: 33 G/DL (ref 33–37)
MCV RBC AUTO: 97 FL (ref 80–100)
MONOCYTES NFR BLD: 7 % (ref 1.7–9.3)
NEUTS BAND NFR BLD: 6 % (ref 0–10)
NEUTS SEG NFR BLD MANUAL: 81 % (ref 42–75.2)
PCO2 BLDA: 36.7 MMHG (ref 35–45)
PLATELET # BLD AUTO: 277 K/MM3 (ref 130–400)
PLATELET BLD QL SMEAR: NORMAL
PMV BLD AUTO: 12.1 FL (ref 7.4–10.4)
PO2 BLDA: 81.2 MMHG (ref 80–100)
POLYCHROMASIA BLD QL SMEAR: (no result)
POTASSIUM SERPL-SCNC: 5 MMOL/L (ref 3.5–4.5)
POTASSIUM SERPL-SCNC: 5.8 MMOL/L (ref 3.5–4.5)
RBC # BLD AUTO: 3.42 M/MM3 (ref 4.2–5.6)
SAO2 % BLDA: 94.6 % (ref 92–100)
SODIUM SERPL-SCNC: 132 MMOL/L (ref 136–145)
SODIUM SERPL-SCNC: 133 MMOL/L (ref 136–145)
TROPONIN I SERPL-MCNC: 0.39 NG/ML (ref 0–0.03)

## 2022-06-17 PROCEDURE — 02HV33Z INSERTION OF INFUSION DEVICE INTO SUPERIOR VENA CAVA, PERCUTANEOUS APPROACH: ICD-10-PCS

## 2022-06-18 VITALS — OXYGEN SATURATION: 97 %

## 2022-06-18 VITALS — OXYGEN SATURATION: 98 %

## 2022-06-18 VITALS — OXYGEN SATURATION: 96 %

## 2022-06-18 VITALS — OXYGEN SATURATION: 95 %

## 2022-06-18 VITALS — OXYGEN SATURATION: 99 %

## 2022-06-18 VITALS
SYSTOLIC BLOOD PRESSURE: 109 MMHG | TEMPERATURE: 97.5 F | OXYGEN SATURATION: 96 % | DIASTOLIC BLOOD PRESSURE: 53 MMHG | HEART RATE: 75 BPM

## 2022-06-18 VITALS — OXYGEN SATURATION: 93 %

## 2022-06-18 VITALS — OXYGEN SATURATION: 94 %

## 2022-06-18 VITALS — OXYGEN SATURATION: 100 %

## 2022-06-18 VITALS
SYSTOLIC BLOOD PRESSURE: 127 MMHG | HEART RATE: 75 BPM | DIASTOLIC BLOOD PRESSURE: 55 MMHG | OXYGEN SATURATION: 99 % | TEMPERATURE: 97.5 F

## 2022-06-18 VITALS
SYSTOLIC BLOOD PRESSURE: 106 MMHG | TEMPERATURE: 97.5 F | OXYGEN SATURATION: 97 % | HEART RATE: 75 BPM | DIASTOLIC BLOOD PRESSURE: 51 MMHG

## 2022-06-18 VITALS
DIASTOLIC BLOOD PRESSURE: 58 MMHG | OXYGEN SATURATION: 97 % | TEMPERATURE: 97.7 F | SYSTOLIC BLOOD PRESSURE: 113 MMHG | HEART RATE: 75 BPM

## 2022-06-18 VITALS — HEART RATE: 55 BPM | DIASTOLIC BLOOD PRESSURE: 53 MMHG | TEMPERATURE: 98.1 F | SYSTOLIC BLOOD PRESSURE: 114 MMHG

## 2022-06-18 VITALS — OXYGEN SATURATION: 91 %

## 2022-06-18 VITALS — SYSTOLIC BLOOD PRESSURE: 105 MMHG | DIASTOLIC BLOOD PRESSURE: 51 MMHG | TEMPERATURE: 97.5 F | HEART RATE: 75 BPM

## 2022-06-18 VITALS — OXYGEN SATURATION: 89 %

## 2022-06-18 VITALS — OXYGEN SATURATION: 92 %

## 2022-06-18 VITALS — OXYGEN SATURATION: 90 %

## 2022-06-18 VITALS — OXYGEN SATURATION: 78 %

## 2022-06-18 VITALS — OXYGEN SATURATION: 84 %

## 2022-06-18 VITALS — OXYGEN SATURATION: 73 %

## 2022-06-18 VITALS — OXYGEN SATURATION: 88 %

## 2022-06-18 LAB
ALBUMIN SERPL-MCNC: 2.6 GM/DL (ref 3.4–4.8)
ALP SERPL-CCNC: 95 U/L (ref 40–150)
ANION GAP SERPL CALC-SCNC: 16 MMOL/L (ref 7–16)
APTT PPP: 82.5 SECONDS (ref 26–37)
BASE EXCESS BLDA CALC-SCNC: -3.5 MMOL/L (ref -2–2)
BASOPHILS # BLD: 0 K/MM3 (ref 0–0.2)
BASOPHILS NFR BLD AUTO: 0.1 % (ref 0–2)
BILIRUB SERPL-MCNC: 1.6 MG/DL (ref 0.2–1.2)
BUN SERPL-MCNC: 100 MG/DL (ref 8–26)
CALCIUM SERPL-MCNC: 7.7 MG/DL (ref 8.4–10.2)
CHLORIDE SERPL-SCNC: 96 MMOL/L (ref 98–107)
CO2 BLDA-SCNC: 20.8 MMOL/L
CO2 SERPL-SCNC: 21 MMOL/L (ref 23–31)
CREAT SERPL-SCNC: 2.76 MG/DL (ref 0.72–1.25)
EOSINOPHIL # BLD: 0 K/MM3 (ref 0–0.7)
EOSINOPHIL NFR BLD: 0 % (ref 0–4)
ERYTHROCYTE [DISTWIDTH] IN BLOOD BY AUTOMATED COUNT: 13.2 % (ref 11.5–14.5)
GLUCOSE SERPL-MCNC: 149 MG/DL (ref 70–99)
GRANULOCYTES # BLD AUTO: 88.9 % (ref 42.2–75.2)
HCO3 BLDA-SCNC: 19.9 MEQ/L (ref 22–26)
HCT VFR BLD AUTO: 28.9 % (ref 42–52)
HGB BLD-MCNC: 9.9 G/DL (ref 13.5–18)
INHALED O2 CONCENTRATION: 28 %
LYMPHOCYTES # BLD: 0.6 K/MM3 (ref 1.2–3.4)
LYMPHOCYTES NFR BLD: 3.9 % (ref 20–51)
MAGNESIUM SERPL-MCNC: 2.4 MG/DL (ref 1.6–2.6)
MCH RBC QN AUTO: 32 PG (ref 27–31)
MCHC RBC AUTO-ENTMCNC: 34 G/DL (ref 33–37)
MCV RBC AUTO: 94 FL (ref 80–100)
MONOCYTES # BLD: 1 K/MM3 (ref 0.1–0.6)
MONOCYTES NFR BLD AUTO: 6.3 % (ref 1.7–9.3)
NEUTROPHILS # BLD: 13.6 K/MM3 (ref 1.4–6.5)
PCO2 BLDA: 30.5 MMHG (ref 35–45)
PLATELET # BLD AUTO: 165 K/MM3 (ref 130–400)
PMV BLD AUTO: 11.7 FL (ref 7.4–10.4)
PO2 BLDA: 77.7 MMHG (ref 80–100)
POTASSIUM SERPL-SCNC: 5.1 MMOL/L (ref 3.5–4.5)
PROT SERPL-MCNC: 6.3 GM/DL (ref 6.2–8.1)
RBC # BLD AUTO: 3.07 M/MM3 (ref 4.2–5.6)
SAO2 % BLDA: 95.4 % (ref 92–100)
SODIUM SERPL-SCNC: 133 MMOL/L (ref 136–145)

## 2022-06-19 VITALS — OXYGEN SATURATION: 96 %

## 2022-06-19 VITALS — OXYGEN SATURATION: 97 %

## 2022-06-19 VITALS
OXYGEN SATURATION: 95 % | SYSTOLIC BLOOD PRESSURE: 127 MMHG | HEART RATE: 74 BPM | DIASTOLIC BLOOD PRESSURE: 55 MMHG | TEMPERATURE: 97.9 F

## 2022-06-19 VITALS — OXYGEN SATURATION: 95 %

## 2022-06-19 VITALS — OXYGEN SATURATION: 94 %

## 2022-06-19 VITALS — OXYGEN SATURATION: 98 %

## 2022-06-19 VITALS — HEART RATE: 75 BPM | DIASTOLIC BLOOD PRESSURE: 56 MMHG | OXYGEN SATURATION: 96 % | SYSTOLIC BLOOD PRESSURE: 129 MMHG

## 2022-06-19 VITALS — OXYGEN SATURATION: 100 %

## 2022-06-19 VITALS
DIASTOLIC BLOOD PRESSURE: 58 MMHG | SYSTOLIC BLOOD PRESSURE: 131 MMHG | OXYGEN SATURATION: 97 % | TEMPERATURE: 97.3 F | HEART RATE: 97 BPM

## 2022-06-19 VITALS
TEMPERATURE: 97.9 F | HEART RATE: 75 BPM | SYSTOLIC BLOOD PRESSURE: 121 MMHG | DIASTOLIC BLOOD PRESSURE: 49 MMHG | OXYGEN SATURATION: 96 %

## 2022-06-19 VITALS
HEART RATE: 75 BPM | DIASTOLIC BLOOD PRESSURE: 55 MMHG | OXYGEN SATURATION: 96 % | TEMPERATURE: 97.8 F | SYSTOLIC BLOOD PRESSURE: 120 MMHG

## 2022-06-19 VITALS
TEMPERATURE: 98.2 F | HEART RATE: 75 BPM | DIASTOLIC BLOOD PRESSURE: 56 MMHG | SYSTOLIC BLOOD PRESSURE: 114 MMHG | OXYGEN SATURATION: 93 %

## 2022-06-19 VITALS — OXYGEN SATURATION: 93 %

## 2022-06-19 VITALS
DIASTOLIC BLOOD PRESSURE: 57 MMHG | TEMPERATURE: 97.4 F | HEART RATE: 75 BPM | SYSTOLIC BLOOD PRESSURE: 134 MMHG | OXYGEN SATURATION: 95 %

## 2022-06-19 VITALS — OXYGEN SATURATION: 99 %

## 2022-06-19 LAB
ALBUMIN SERPL-MCNC: 2.5 GM/DL (ref 3.4–4.8)
ALP SERPL-CCNC: 115 U/L (ref 40–150)
ALT SERPL-CCNC: 1438 U/L (ref 0–55)
ANION GAP SERPL CALC-SCNC: 12 MMOL/L (ref 7–16)
AST SERPL-CCNC: 661 U/L (ref 5–34)
BASOPHILS # BLD: 0 K/MM3 (ref 0–0.2)
BASOPHILS NFR BLD AUTO: 0 % (ref 0–2)
BILIRUB SERPL-MCNC: 1.3 MG/DL (ref 0.2–1.2)
BUN SERPL-MCNC: 87 MG/DL (ref 8–26)
CALCIUM SERPL-MCNC: 7.9 MG/DL (ref 8.4–10.2)
CHLORIDE SERPL-SCNC: 99 MMOL/L (ref 98–107)
CO2 SERPL-SCNC: 24 MMOL/L (ref 23–31)
CREAT SERPL-SCNC: 2.18 MG/DL (ref 0.72–1.25)
EOSINOPHIL # BLD: 0 K/MM3 (ref 0–0.7)
EOSINOPHIL NFR BLD: 0.1 % (ref 0–4)
ERYTHROCYTE [DISTWIDTH] IN BLOOD BY AUTOMATED COUNT: 13.2 % (ref 11.5–14.5)
GLUCOSE SERPL-MCNC: 116 MG/DL (ref 70–99)
GRANULOCYTES # BLD AUTO: 86.9 % (ref 42.2–75.2)
HCT VFR BLD AUTO: 28.1 % (ref 42–52)
HGB BLD-MCNC: 9.7 G/DL (ref 13.5–18)
LYMPHOCYTES # BLD: 0.6 K/MM3 (ref 1.2–3.4)
LYMPHOCYTES NFR BLD: 5.7 % (ref 20–51)
MAGNESIUM SERPL-MCNC: 2.4 MG/DL (ref 1.6–2.6)
MCH RBC QN AUTO: 33 PG (ref 27–31)
MCHC RBC AUTO-ENTMCNC: 35 G/DL (ref 33–37)
MCV RBC AUTO: 96 FL (ref 80–100)
MONOCYTES # BLD: 0.7 K/MM3 (ref 0.1–0.6)
MONOCYTES NFR BLD AUTO: 6.1 % (ref 1.7–9.3)
NEUTROPHILS # BLD: 9.7 K/MM3 (ref 1.4–6.5)
PLATELET # BLD AUTO: 164 K/MM3 (ref 130–400)
PMV BLD AUTO: 11.4 FL (ref 7.4–10.4)
POTASSIUM SERPL-SCNC: 4.3 MMOL/L (ref 3.5–4.5)
PROT SERPL-MCNC: 6 GM/DL (ref 6.2–8.1)
RBC # BLD AUTO: 2.92 M/MM3 (ref 4.2–5.6)
SODIUM SERPL-SCNC: 135 MMOL/L (ref 136–145)

## 2022-06-20 VITALS — OXYGEN SATURATION: 96 %

## 2022-06-20 VITALS — OXYGEN SATURATION: 95 %

## 2022-06-20 VITALS — OXYGEN SATURATION: 92 %

## 2022-06-20 VITALS — OXYGEN SATURATION: 94 %

## 2022-06-20 VITALS — OXYGEN SATURATION: 93 %

## 2022-06-20 VITALS — OXYGEN SATURATION: 91 %

## 2022-06-20 VITALS — OXYGEN SATURATION: 98 %

## 2022-06-20 VITALS — OXYGEN SATURATION: 99 %

## 2022-06-20 VITALS — OXYGEN SATURATION: 97 %

## 2022-06-20 VITALS — HEART RATE: 74 BPM | TEMPERATURE: 97.5 F | DIASTOLIC BLOOD PRESSURE: 45 MMHG | SYSTOLIC BLOOD PRESSURE: 107 MMHG

## 2022-06-20 VITALS
OXYGEN SATURATION: 95 % | HEART RATE: 72 BPM | DIASTOLIC BLOOD PRESSURE: 47 MMHG | TEMPERATURE: 97.1 F | SYSTOLIC BLOOD PRESSURE: 105 MMHG

## 2022-06-20 VITALS — OXYGEN SATURATION: 89 %

## 2022-06-20 VITALS
DIASTOLIC BLOOD PRESSURE: 47 MMHG | SYSTOLIC BLOOD PRESSURE: 98 MMHG | OXYGEN SATURATION: 94 % | TEMPERATURE: 97.7 F | HEART RATE: 75 BPM

## 2022-06-20 VITALS — OXYGEN SATURATION: 100 %

## 2022-06-20 VITALS — OXYGEN SATURATION: 88 %

## 2022-06-20 VITALS
HEART RATE: 75 BPM | TEMPERATURE: 97.6 F | DIASTOLIC BLOOD PRESSURE: 52 MMHG | SYSTOLIC BLOOD PRESSURE: 116 MMHG | OXYGEN SATURATION: 93 %

## 2022-06-20 VITALS
TEMPERATURE: 97 F | HEART RATE: 75 BPM | OXYGEN SATURATION: 94 % | DIASTOLIC BLOOD PRESSURE: 66 MMHG | SYSTOLIC BLOOD PRESSURE: 92 MMHG

## 2022-06-20 VITALS
OXYGEN SATURATION: 95 % | HEART RATE: 75 BPM | SYSTOLIC BLOOD PRESSURE: 120 MMHG | TEMPERATURE: 97.9 F | DIASTOLIC BLOOD PRESSURE: 55 MMHG

## 2022-06-20 VITALS — DIASTOLIC BLOOD PRESSURE: 53 MMHG | HEART RATE: 75 BPM | OXYGEN SATURATION: 97 % | SYSTOLIC BLOOD PRESSURE: 118 MMHG

## 2022-06-20 VITALS — OXYGEN SATURATION: 90 %

## 2022-06-20 LAB
ALBUMIN SERPL-MCNC: 2.4 GM/DL (ref 3.4–4.8)
ALP SERPL-CCNC: 92 U/L (ref 40–150)
ALT SERPL-CCNC: 985 U/L (ref 0–55)
ANION GAP SERPL CALC-SCNC: 10 MMOL/L (ref 7–16)
AST SERPL-CCNC: 281 U/L (ref 5–34)
BILIRUB SERPL-MCNC: 1.2 MG/DL (ref 0.2–1.2)
BUN SERPL-MCNC: 64 MG/DL (ref 8–26)
CALCIUM SERPL-MCNC: 8.1 MG/DL (ref 8.4–10.2)
CHLORIDE SERPL-SCNC: 102 MMOL/L (ref 98–107)
CO2 SERPL-SCNC: 25 MMOL/L (ref 23–31)
CREAT SERPL-SCNC: 1.81 MG/DL (ref 0.72–1.25)
ERYTHROCYTE [DISTWIDTH] IN BLOOD BY AUTOMATED COUNT: 13.4 % (ref 11.5–14.5)
GLUCOSE SERPL-MCNC: 73 MG/DL (ref 70–99)
HCT VFR BLD AUTO: 29 % (ref 42–52)
HGB BLD-MCNC: 9.5 G/DL (ref 13.5–18)
LYMPHOCYTES NFR BLD MANUAL: 13 % (ref 20–51)
MCH RBC QN AUTO: 33 PG (ref 27–31)
MCHC RBC AUTO-ENTMCNC: 33 G/DL (ref 33–37)
MCV RBC AUTO: 100 FL (ref 80–100)
MONOCYTES NFR BLD: 5 % (ref 1.7–9.3)
NEUTS BAND NFR BLD: 4 % (ref 0–10)
NEUTS SEG NFR BLD MANUAL: 78 % (ref 42–75.2)
NRBC BLD AUTO-RTO: 1 % (ref 0–6)
PLATELET # BLD AUTO: 150 K/MM3 (ref 130–400)
PLATELET BLD QL SMEAR: NORMAL
PMV BLD AUTO: 10.8 FL (ref 7.4–10.4)
POTASSIUM SERPL-SCNC: 4.7 MMOL/L (ref 3.5–4.5)
PROT SERPL-MCNC: 5.8 GM/DL (ref 6.2–8.1)
RBC # BLD AUTO: 2.89 M/MM3 (ref 4.2–5.6)
SODIUM SERPL-SCNC: 137 MMOL/L (ref 136–145)

## 2022-06-21 VITALS — OXYGEN SATURATION: 94 %

## 2022-06-21 VITALS — OXYGEN SATURATION: 96 %

## 2022-06-21 VITALS — OXYGEN SATURATION: 91 %

## 2022-06-21 VITALS — OXYGEN SATURATION: 95 %

## 2022-06-21 VITALS — OXYGEN SATURATION: 97 %

## 2022-06-21 VITALS — OXYGEN SATURATION: 98 %

## 2022-06-21 VITALS — OXYGEN SATURATION: 86 %

## 2022-06-21 VITALS
HEART RATE: 75 BPM | DIASTOLIC BLOOD PRESSURE: 57 MMHG | TEMPERATURE: 96.9 F | OXYGEN SATURATION: 97 % | SYSTOLIC BLOOD PRESSURE: 108 MMHG

## 2022-06-21 VITALS — TEMPERATURE: 98.4 F | SYSTOLIC BLOOD PRESSURE: 107 MMHG | HEART RATE: 75 BPM | DIASTOLIC BLOOD PRESSURE: 45 MMHG

## 2022-06-21 VITALS — OXYGEN SATURATION: 100 %

## 2022-06-21 VITALS — OXYGEN SATURATION: 99 %

## 2022-06-21 VITALS — OXYGEN SATURATION: 93 %

## 2022-06-21 VITALS
DIASTOLIC BLOOD PRESSURE: 55 MMHG | TEMPERATURE: 98.7 F | HEART RATE: 75 BPM | SYSTOLIC BLOOD PRESSURE: 114 MMHG | OXYGEN SATURATION: 95 %

## 2022-06-21 VITALS
HEART RATE: 75 BPM | SYSTOLIC BLOOD PRESSURE: 116 MMHG | DIASTOLIC BLOOD PRESSURE: 56 MMHG | TEMPERATURE: 97.9 F | OXYGEN SATURATION: 97 %

## 2022-06-21 VITALS — OXYGEN SATURATION: 81 %

## 2022-06-21 VITALS — TEMPERATURE: 96.1 F | DIASTOLIC BLOOD PRESSURE: 58 MMHG | HEART RATE: 75 BPM | SYSTOLIC BLOOD PRESSURE: 122 MMHG

## 2022-06-21 VITALS — OXYGEN SATURATION: 84 %

## 2022-06-21 VITALS — OXYGEN SATURATION: 90 %

## 2022-06-21 VITALS — OXYGEN SATURATION: 76 %

## 2022-06-21 VITALS — OXYGEN SATURATION: 92 %

## 2022-06-21 LAB
ALBUMIN SERPL-MCNC: 2.4 GM/DL (ref 3.4–4.8)
ALP SERPL-CCNC: 86 U/L (ref 40–150)
ALT SERPL-CCNC: 712 U/L (ref 0–55)
ANION GAP SERPL CALC-SCNC: 13 MMOL/L (ref 7–16)
AST SERPL-CCNC: 140 U/L (ref 5–34)
BILIRUB SERPL-MCNC: 1.2 MG/DL (ref 0.2–1.2)
BUN SERPL-MCNC: 54 MG/DL (ref 8–26)
CALCIUM SERPL-MCNC: 8.2 MG/DL (ref 8.4–10.2)
CHLORIDE SERPL-SCNC: 103 MMOL/L (ref 98–107)
CO2 SERPL-SCNC: 23 MMOL/L (ref 23–31)
CREAT SERPL-SCNC: 1.89 MG/DL (ref 0.72–1.25)
EOSINOPHIL NFR BLD: 1 % (ref 0–4)
ERYTHROCYTE [DISTWIDTH] IN BLOOD BY AUTOMATED COUNT: 13.8 % (ref 11.5–14.5)
GLUCOSE SERPL-MCNC: 133 MG/DL (ref 70–99)
HCT VFR BLD AUTO: 31 % (ref 42–52)
HGB BLD-MCNC: 10 G/DL (ref 13.5–18)
LYMPHOCYTES NFR BLD MANUAL: 8 % (ref 20–51)
MCH RBC QN AUTO: 32 PG (ref 27–31)
MCHC RBC AUTO-ENTMCNC: 32 G/DL (ref 33–37)
MCV RBC AUTO: 100 FL (ref 80–100)
MONOCYTES NFR BLD: 7 % (ref 1.7–9.3)
NEUTS SEG NFR BLD MANUAL: 84 % (ref 42–75.2)
PLATELET # BLD AUTO: 154 K/MM3 (ref 130–400)
PLATELET BLD QL SMEAR: NORMAL
PMV BLD AUTO: 11.1 FL (ref 7.4–10.4)
POTASSIUM SERPL-SCNC: 4.5 MMOL/L (ref 3.5–4.5)
PROT SERPL-MCNC: 6.1 GM/DL (ref 6.2–8.1)
RBC # BLD AUTO: 3.09 M/MM3 (ref 4.2–5.6)
SODIUM SERPL-SCNC: 139 MMOL/L (ref 136–145)

## 2022-06-22 VITALS — TEMPERATURE: 97.7 F | HEART RATE: 75 BPM | SYSTOLIC BLOOD PRESSURE: 103 MMHG | DIASTOLIC BLOOD PRESSURE: 46 MMHG

## 2022-06-22 VITALS — TEMPERATURE: 98.2 F | HEART RATE: 80 BPM | DIASTOLIC BLOOD PRESSURE: 52 MMHG | SYSTOLIC BLOOD PRESSURE: 125 MMHG

## 2022-06-22 VITALS — HEART RATE: 82 BPM | TEMPERATURE: 97.5 F | SYSTOLIC BLOOD PRESSURE: 112 MMHG | DIASTOLIC BLOOD PRESSURE: 53 MMHG

## 2022-06-22 VITALS — SYSTOLIC BLOOD PRESSURE: 106 MMHG | DIASTOLIC BLOOD PRESSURE: 52 MMHG | TEMPERATURE: 97.8 F | HEART RATE: 77 BPM

## 2022-06-22 VITALS — SYSTOLIC BLOOD PRESSURE: 120 MMHG | HEART RATE: 79 BPM | TEMPERATURE: 97.8 F | DIASTOLIC BLOOD PRESSURE: 57 MMHG

## 2022-06-22 VITALS — HEART RATE: 92 BPM | SYSTOLIC BLOOD PRESSURE: 114 MMHG | DIASTOLIC BLOOD PRESSURE: 48 MMHG | TEMPERATURE: 97.6 F

## 2022-06-22 VITALS — TEMPERATURE: 98.4 F | DIASTOLIC BLOOD PRESSURE: 46 MMHG | HEART RATE: 79 BPM | SYSTOLIC BLOOD PRESSURE: 127 MMHG

## 2022-06-22 LAB
C DIFF TOX A+B STL IA-ACNC: (no result)
C DIFF TOX A+B STL QL IA: (no result)

## 2022-06-23 VITALS — DIASTOLIC BLOOD PRESSURE: 66 MMHG | HEART RATE: 77 BPM | TEMPERATURE: 98.4 F | SYSTOLIC BLOOD PRESSURE: 120 MMHG

## 2022-06-23 VITALS — DIASTOLIC BLOOD PRESSURE: 56 MMHG | SYSTOLIC BLOOD PRESSURE: 115 MMHG | HEART RATE: 73 BPM | TEMPERATURE: 97.9 F

## 2022-06-23 VITALS — TEMPERATURE: 97.8 F | DIASTOLIC BLOOD PRESSURE: 55 MMHG | SYSTOLIC BLOOD PRESSURE: 115 MMHG | HEART RATE: 74 BPM

## 2022-06-23 LAB
ALBUMIN SERPL-MCNC: 2.3 GM/DL (ref 3.4–4.8)
ALP SERPL-CCNC: 72 U/L (ref 40–150)
ALT SERPL-CCNC: 321 U/L (ref 0–55)
ANION GAP SERPL CALC-SCNC: 16 MMOL/L (ref 7–16)
AST SERPL-CCNC: 42 U/L (ref 5–34)
BILIRUB SERPL-MCNC: 1.3 MG/DL (ref 0.2–1.2)
BUN SERPL-MCNC: 54 MG/DL (ref 8–26)
CALCIUM SERPL-MCNC: 8.1 MG/DL (ref 8.4–10.2)
CHLORIDE SERPL-SCNC: 103 MMOL/L (ref 98–107)
CO2 SERPL-SCNC: 22 MMOL/L (ref 23–31)
CREAT SERPL-SCNC: 2.01 MG/DL (ref 0.72–1.25)
ERYTHROCYTE [DISTWIDTH] IN BLOOD BY AUTOMATED COUNT: 14.3 % (ref 11.5–14.5)
GLUCOSE SERPL-MCNC: 161 MG/DL (ref 70–99)
HCT VFR BLD AUTO: 29.6 % (ref 42–52)
HGB BLD-MCNC: 9.3 G/DL (ref 13.5–18)
LYMPHOCYTES NFR BLD MANUAL: 8 % (ref 20–51)
MAGNESIUM SERPL-MCNC: 2.1 MG/DL (ref 1.6–2.6)
MCH RBC QN AUTO: 32 PG (ref 27–31)
MCHC RBC AUTO-ENTMCNC: 31 G/DL (ref 33–37)
MCV RBC AUTO: 102 FL (ref 80–100)
METAMYELOCYTES NFR BLD MANUAL: 2 % (ref 0–0)
MONOCYTES NFR BLD: 4 % (ref 1.7–9.3)
NEUTS BAND NFR BLD: 11 % (ref 0–10)
NEUTS SEG NFR BLD MANUAL: 75 % (ref 42–75.2)
PLATELET # BLD AUTO: 134 K/MM3 (ref 130–400)
PLATELET BLD QL SMEAR: NORMAL
PMV BLD AUTO: 11.8 FL (ref 7.4–10.4)
POTASSIUM SERPL-SCNC: 4.3 MMOL/L (ref 3.5–4.5)
PROT SERPL-MCNC: 6 GM/DL (ref 6.2–8.1)
RBC # BLD AUTO: 2.89 M/MM3 (ref 4.2–5.6)
SODIUM SERPL-SCNC: 141 MMOL/L (ref 136–145)